# Patient Record
Sex: MALE | Race: WHITE | HISPANIC OR LATINO | URBAN - METROPOLITAN AREA
[De-identification: names, ages, dates, MRNs, and addresses within clinical notes are randomized per-mention and may not be internally consistent; named-entity substitution may affect disease eponyms.]

---

## 2017-11-21 ENCOUNTER — EMERGENCY (EMERGENCY)
Facility: HOSPITAL | Age: 57
LOS: 1 days | Discharge: HOME | End: 2017-11-21
Admitting: EMERGENCY MEDICINE
Payer: OTHER MISCELLANEOUS

## 2017-11-21 VITALS
DIASTOLIC BLOOD PRESSURE: 82 MMHG | HEART RATE: 71 BPM | SYSTOLIC BLOOD PRESSURE: 172 MMHG | OXYGEN SATURATION: 96 % | RESPIRATION RATE: 16 BRPM

## 2017-11-21 PROCEDURE — 72100 X-RAY EXAM L-S SPINE 2/3 VWS: CPT | Mod: 26

## 2017-11-21 PROCEDURE — 72125 CT NECK SPINE W/O DYE: CPT | Mod: 26

## 2017-11-21 PROCEDURE — 70450 CT HEAD/BRAIN W/O DYE: CPT | Mod: 26

## 2017-11-21 PROCEDURE — 99284 EMERGENCY DEPT VISIT MOD MDM: CPT

## 2017-11-21 RX ORDER — IBUPROFEN 200 MG
1 TABLET ORAL
Qty: 15 | Refills: 0 | OUTPATIENT
Start: 2017-11-21 | End: 2017-11-26

## 2017-11-21 RX ORDER — DIAZEPAM 5 MG
1 TABLET ORAL
Qty: 9 | Refills: 0 | OUTPATIENT
Start: 2017-11-21 | End: 2017-11-24

## 2017-11-21 RX ORDER — ACETAMINOPHEN 500 MG
975 TABLET ORAL ONCE
Qty: 0 | Refills: 0 | Status: COMPLETED | OUTPATIENT
Start: 2017-11-21 | End: 2017-11-21

## 2017-11-21 RX ADMIN — Medication 975 MILLIGRAM(S): at 12:30

## 2017-11-21 NOTE — ED PROVIDER NOTE - PLAN OF CARE
Rest, advance activity as tolerated  Apply heat to affected area 15 min on/15 min off  Take Motrin 800mg every 8 hours with food as needed for pain  Take Valium 5mg every 8 hours as needed - DO NOT DRIVE ON THIS MEDICATION  Follow up with your PMD within 2-3 days  Return to the ER for worsening

## 2017-11-21 NOTE — ED PROVIDER NOTE - PROGRESS NOTE DETAILS
PA [facility]: Ct head/c spine negative. FROM without discomfort, relief with tylenol and valium. C spine cleared, collar removed, will send for xray lumbar spine and reassess. PA [facility]il: Pt ambulating without difficulty, pain improved with medications. Spoke with radiology resident, lumbar spine xray appears normal, will post sticky note as patient wants to leave. Pt stable for dc home with f/u.
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
## 2017-11-21 NOTE — ED PROVIDER NOTE - CHPI ED SYMPTOMS NEG
no bruising/no decreased eating/drinking/no disorientation/no dizziness/no loss of consciousness/no difficulty bearing weight/no laceration

## 2021-10-26 NOTE — ED PROVIDER NOTE - NS ED MD DISPO DISCHARGE
normal appearance , without tenderness upon palpation , no deformities , trachea midline , Thyroid normal size , no thyroid nodules , no masses , no JVD , thyroid nontender
Home